# Patient Record
Sex: FEMALE | Race: WHITE | NOT HISPANIC OR LATINO
[De-identification: names, ages, dates, MRNs, and addresses within clinical notes are randomized per-mention and may not be internally consistent; named-entity substitution may affect disease eponyms.]

---

## 2017-01-25 ENCOUNTER — APPOINTMENT (OUTPATIENT)
Dept: UROLOGY | Facility: CLINIC | Age: 67
End: 2017-01-25

## 2017-01-25 VITALS
TEMPERATURE: 98.7 F | SYSTOLIC BLOOD PRESSURE: 169 MMHG | DIASTOLIC BLOOD PRESSURE: 84 MMHG | RESPIRATION RATE: 18 BRPM | HEART RATE: 94 BPM

## 2017-01-25 DIAGNOSIS — Z80.3 FAMILY HISTORY OF MALIGNANT NEOPLASM OF BREAST: ICD-10-CM

## 2017-01-27 LAB
APPEARANCE: CLEAR
BACTERIA UR CULT: NORMAL
BACTERIA: NEGATIVE
BILIRUBIN URINE: NEGATIVE
BLOOD URINE: NEGATIVE
COLOR: YELLOW
GLUCOSE QUALITATIVE U: NORMAL MG/DL
HYALINE CASTS: 0 /LPF
KETONES URINE: NEGATIVE
LEUKOCYTE ESTERASE URINE: NEGATIVE
MICROSCOPIC-UA: NORMAL
NITRITE URINE: NEGATIVE
PH URINE: 6
PROTEIN URINE: NEGATIVE MG/DL
RED BLOOD CELLS URINE: 0 /HPF
SPECIFIC GRAVITY URINE: 1.01
SQUAMOUS EPITHELIAL CELLS: 2 /HPF
UROBILINOGEN URINE: NORMAL MG/DL
WHITE BLOOD CELLS URINE: 1 /HPF

## 2017-04-06 ENCOUNTER — APPOINTMENT (OUTPATIENT)
Dept: ORTHOPEDIC SURGERY | Facility: CLINIC | Age: 67
End: 2017-04-06

## 2017-04-06 VITALS — BODY MASS INDEX: 27.96 KG/M2 | HEIGHT: 61.5 IN | WEIGHT: 150 LBS

## 2017-04-06 VITALS — HEART RATE: 79 BPM | SYSTOLIC BLOOD PRESSURE: 176 MMHG | DIASTOLIC BLOOD PRESSURE: 81 MMHG

## 2017-04-25 ENCOUNTER — LABORATORY RESULT (OUTPATIENT)
Age: 67
End: 2017-04-25

## 2017-05-02 ENCOUNTER — APPOINTMENT (OUTPATIENT)
Dept: ENDOCRINOLOGY | Facility: CLINIC | Age: 67
End: 2017-05-02

## 2017-05-02 VITALS
WEIGHT: 149 LBS | OXYGEN SATURATION: 98 % | SYSTOLIC BLOOD PRESSURE: 102 MMHG | BODY MASS INDEX: 28.5 KG/M2 | HEIGHT: 60.5 IN | HEART RATE: 93 BPM | DIASTOLIC BLOOD PRESSURE: 72 MMHG

## 2018-03-15 ENCOUNTER — APPOINTMENT (OUTPATIENT)
Dept: MAMMOGRAPHY | Facility: IMAGING CENTER | Age: 68
End: 2018-03-15
Payer: MEDICARE

## 2018-03-15 ENCOUNTER — OUTPATIENT (OUTPATIENT)
Dept: OUTPATIENT SERVICES | Facility: HOSPITAL | Age: 68
LOS: 1 days | End: 2018-03-15
Payer: MEDICARE

## 2018-03-15 DIAGNOSIS — Z00.8 ENCOUNTER FOR OTHER GENERAL EXAMINATION: ICD-10-CM

## 2018-03-15 PROCEDURE — 77063 BREAST TOMOSYNTHESIS BI: CPT | Mod: 26

## 2018-03-15 PROCEDURE — 77067 SCR MAMMO BI INCL CAD: CPT

## 2018-03-15 PROCEDURE — 77067 SCR MAMMO BI INCL CAD: CPT | Mod: 26

## 2018-03-15 PROCEDURE — 77063 BREAST TOMOSYNTHESIS BI: CPT

## 2018-05-01 ENCOUNTER — APPOINTMENT (OUTPATIENT)
Dept: ENDOCRINOLOGY | Facility: CLINIC | Age: 68
End: 2018-05-01

## 2018-05-31 ENCOUNTER — RESULT REVIEW (OUTPATIENT)
Age: 68
End: 2018-05-31

## 2019-04-30 ENCOUNTER — APPOINTMENT (OUTPATIENT)
Age: 69
End: 2019-04-30
Payer: MEDICARE

## 2019-04-30 ENCOUNTER — OUTPATIENT (OUTPATIENT)
Dept: OUTPATIENT SERVICES | Facility: HOSPITAL | Age: 69
LOS: 1 days | End: 2019-04-30
Payer: MEDICARE

## 2019-04-30 DIAGNOSIS — Z00.8 ENCOUNTER FOR OTHER GENERAL EXAMINATION: ICD-10-CM

## 2019-04-30 PROCEDURE — 77063 BREAST TOMOSYNTHESIS BI: CPT | Mod: 26

## 2019-04-30 PROCEDURE — 77067 SCR MAMMO BI INCL CAD: CPT

## 2019-04-30 PROCEDURE — 77067 SCR MAMMO BI INCL CAD: CPT | Mod: 26

## 2019-04-30 PROCEDURE — 76641 ULTRASOUND BREAST COMPLETE: CPT | Mod: 26,50

## 2019-04-30 PROCEDURE — 77063 BREAST TOMOSYNTHESIS BI: CPT

## 2019-04-30 PROCEDURE — 76641 ULTRASOUND BREAST COMPLETE: CPT

## 2019-07-03 ENCOUNTER — APPOINTMENT (OUTPATIENT)
Dept: INTERNAL MEDICINE | Facility: CLINIC | Age: 69
End: 2019-07-03
Payer: MEDICARE

## 2019-07-03 PROCEDURE — 36415 COLL VENOUS BLD VENIPUNCTURE: CPT

## 2019-07-05 ENCOUNTER — RX RENEWAL (OUTPATIENT)
Age: 69
End: 2019-07-05

## 2019-07-05 ENCOUNTER — RECORD ABSTRACTING (OUTPATIENT)
Age: 69
End: 2019-07-05

## 2019-07-05 DIAGNOSIS — Z82.49 FAMILY HISTORY OF ISCHEMIC HEART DISEASE AND OTHER DISEASES OF THE CIRCULATORY SYSTEM: ICD-10-CM

## 2019-07-05 DIAGNOSIS — Z92.89 PERSONAL HISTORY OF OTHER MEDICAL TREATMENT: ICD-10-CM

## 2019-07-05 DIAGNOSIS — R73.03 PREDIABETES.: ICD-10-CM

## 2019-07-05 DIAGNOSIS — Z87.39 PERSONAL HISTORY OF OTHER DISEASES OF THE MUSCULOSKELETAL SYSTEM AND CONNECTIVE TISSUE: ICD-10-CM

## 2019-07-08 ENCOUNTER — NON-APPOINTMENT (OUTPATIENT)
Age: 69
End: 2019-07-08

## 2019-07-08 ENCOUNTER — APPOINTMENT (OUTPATIENT)
Dept: INTERNAL MEDICINE | Facility: CLINIC | Age: 69
End: 2019-07-08
Payer: MEDICARE

## 2019-07-08 VITALS — BODY MASS INDEX: 27.48 KG/M2 | HEIGHT: 60 IN | WEIGHT: 140 LBS

## 2019-07-08 VITALS — DIASTOLIC BLOOD PRESSURE: 70 MMHG | SYSTOLIC BLOOD PRESSURE: 110 MMHG

## 2019-07-08 DIAGNOSIS — M75.81 OTHER SHOULDER LESIONS, RIGHT SHOULDER: ICD-10-CM

## 2019-07-08 LAB
ALBUMIN SERPL ELPH-MCNC: 4.3 G/DL
ALP BLD-CCNC: 71 U/L
ALT SERPL-CCNC: 18 U/L
ANION GAP SERPL CALC-SCNC: 11 MMOL/L
AST SERPL-CCNC: 22 U/L
BASOPHILS # BLD AUTO: 0.04 K/UL
BASOPHILS NFR BLD AUTO: 0.6 %
BILIRUB SERPL-MCNC: 0.8 MG/DL
BILIRUB UR QL STRIP: NEGATIVE
BUN SERPL-MCNC: 15 MG/DL
CALCIUM SERPL-MCNC: 9.6 MG/DL
CHLORIDE SERPL-SCNC: 106 MMOL/L
CHOLEST SERPL-MCNC: 220 MG/DL
CHOLEST/HDLC SERPL: 2.4 RATIO
CO2 SERPL-SCNC: 25 MMOL/L
CREAT SERPL-MCNC: 0.88 MG/DL
EOSINOPHIL # BLD AUTO: 0.2 K/UL
EOSINOPHIL NFR BLD AUTO: 2.9 %
GLUCOSE SERPL-MCNC: 91 MG/DL
GLUCOSE UR-MCNC: NEGATIVE
HCG UR QL: 0.2 EU/DL
HCT VFR BLD CALC: 41.5 %
HDLC SERPL-MCNC: 93 MG/DL
HGB BLD-MCNC: 12.9 G/DL
HGB UR QL STRIP.AUTO: NEGATIVE
IMM GRANULOCYTES NFR BLD AUTO: 0.1 %
KETONES UR-MCNC: NEGATIVE
LDLC SERPL CALC-MCNC: 115 MG/DL
LEUKOCYTE ESTERASE UR QL STRIP: NORMAL
LYMPHOCYTES # BLD AUTO: 2.41 K/UL
LYMPHOCYTES NFR BLD AUTO: 35 %
MAN DIFF?: NORMAL
MCHC RBC-ENTMCNC: 29 PG
MCHC RBC-ENTMCNC: 31.1 GM/DL
MCV RBC AUTO: 93.3 FL
MONOCYTES # BLD AUTO: 0.57 K/UL
MONOCYTES NFR BLD AUTO: 8.3 %
NEUTROPHILS # BLD AUTO: 3.65 K/UL
NEUTROPHILS NFR BLD AUTO: 53.1 %
NITRITE UR QL STRIP: NEGATIVE
PH UR STRIP: 5.5
PLATELET # BLD AUTO: 210 K/UL
POTASSIUM SERPL-SCNC: 5.4 MMOL/L
PROT SERPL-MCNC: 6.7 G/DL
PROT UR STRIP-MCNC: NORMAL
RBC # BLD: 4.45 M/UL
RBC # FLD: 13.1 %
SODIUM SERPL-SCNC: 142 MMOL/L
SP GR UR STRIP: 1.03
TRIGL SERPL-MCNC: 61 MG/DL
WBC # FLD AUTO: 6.88 K/UL

## 2019-07-08 PROCEDURE — G0442 ANNUAL ALCOHOL SCREEN 15 MIN: CPT | Mod: 59

## 2019-07-08 PROCEDURE — G0444 DEPRESSION SCREEN ANNUAL: CPT | Mod: 59

## 2019-07-08 PROCEDURE — G0439: CPT

## 2019-07-08 PROCEDURE — 81003 URINALYSIS AUTO W/O SCOPE: CPT | Mod: QW

## 2019-07-08 PROCEDURE — 93000 ELECTROCARDIOGRAM COMPLETE: CPT

## 2019-07-08 PROCEDURE — 93306 TTE W/DOPPLER COMPLETE: CPT

## 2019-07-08 RX ORDER — IBANDRONATE SODIUM 150 MG/1
150 TABLET ORAL
Qty: 3 | Refills: 3 | Status: COMPLETED | COMMUNITY
Start: 2017-05-22 | End: 2019-07-08

## 2019-07-08 RX ORDER — DICLOFENAC SODIUM 1% 10 MG/G
GEL TOPICAL
Refills: 0 | Status: COMPLETED | COMMUNITY
End: 2019-07-08

## 2019-07-08 NOTE — HISTORY OF PRESENT ILLNESS
[FreeTextEntry1] : here for complete exam [de-identified] : exercises regular\par yoga, some weights, some aerobics, no symptoms with exercise\par past thyroid cancer 1997, sees elizabet for this and osteoporosis\par hx aortic insufficiency, father had AVR

## 2019-07-08 NOTE — HEALTH RISK ASSESSMENT
[No] : No [No falls in past year] : Patient reported no falls in the past year [0] : 1) Little interest or pleasure doing things: Not at all (0) [Patient reported mammogram was normal] : Patient reported mammogram was normal [Patient reported colonoscopy was normal] : Patient reported colonoscopy was normal [] : No [[id number]] : 0 [MammogramDate] : 4/2019 [BoneDensityComments] : followed by endo [ColonoscopyDate] : 9/2013

## 2019-07-10 ENCOUNTER — TRANSCRIPTION ENCOUNTER (OUTPATIENT)
Age: 69
End: 2019-07-10

## 2019-10-22 ENCOUNTER — APPOINTMENT (OUTPATIENT)
Dept: ULTRASOUND IMAGING | Facility: IMAGING CENTER | Age: 69
End: 2019-10-22
Payer: MEDICARE

## 2019-10-22 ENCOUNTER — APPOINTMENT (OUTPATIENT)
Dept: RADIOLOGY | Facility: IMAGING CENTER | Age: 69
End: 2019-10-22
Payer: MEDICARE

## 2019-10-22 ENCOUNTER — OUTPATIENT (OUTPATIENT)
Dept: OUTPATIENT SERVICES | Facility: HOSPITAL | Age: 69
LOS: 1 days | End: 2019-10-22
Payer: MEDICARE

## 2019-10-22 DIAGNOSIS — C73 MALIGNANT NEOPLASM OF THYROID GLAND: ICD-10-CM

## 2019-10-22 PROCEDURE — 77080 DXA BONE DENSITY AXIAL: CPT

## 2019-10-22 PROCEDURE — 77080 DXA BONE DENSITY AXIAL: CPT | Mod: 26

## 2019-10-22 PROCEDURE — 76536 US EXAM OF HEAD AND NECK: CPT | Mod: 26

## 2019-10-22 PROCEDURE — 76536 US EXAM OF HEAD AND NECK: CPT

## 2019-11-06 ENCOUNTER — RESULT REVIEW (OUTPATIENT)
Age: 69
End: 2019-11-06

## 2019-11-06 ENCOUNTER — APPOINTMENT (OUTPATIENT)
Dept: ULTRASOUND IMAGING | Facility: IMAGING CENTER | Age: 69
End: 2019-11-06
Payer: MEDICARE

## 2019-11-06 ENCOUNTER — OUTPATIENT (OUTPATIENT)
Dept: OUTPATIENT SERVICES | Facility: HOSPITAL | Age: 69
LOS: 1 days | End: 2019-11-06
Payer: MEDICARE

## 2019-11-06 DIAGNOSIS — C73 MALIGNANT NEOPLASM OF THYROID GLAND: ICD-10-CM

## 2019-11-06 PROCEDURE — 88173 CYTOPATH EVAL FNA REPORT: CPT | Mod: 26

## 2019-11-06 PROCEDURE — 88173 CYTOPATH EVAL FNA REPORT: CPT

## 2019-11-06 PROCEDURE — 10005 FNA BX W/US GDN 1ST LES: CPT

## 2019-11-07 LAB — NON-GYNECOLOGICAL CYTOLOGY STUDY: SIGNIFICANT CHANGE UP

## 2019-11-16 ENCOUNTER — TRANSCRIPTION ENCOUNTER (OUTPATIENT)
Age: 69
End: 2019-11-16

## 2019-11-20 ENCOUNTER — APPOINTMENT (OUTPATIENT)
Dept: INTERNAL MEDICINE | Facility: CLINIC | Age: 69
End: 2019-11-20
Payer: MEDICARE

## 2019-11-20 VITALS
BODY MASS INDEX: 27.88 KG/M2 | DIASTOLIC BLOOD PRESSURE: 87 MMHG | HEART RATE: 87 BPM | WEIGHT: 142 LBS | OXYGEN SATURATION: 99 % | TEMPERATURE: 98.5 F | SYSTOLIC BLOOD PRESSURE: 167 MMHG | HEIGHT: 60 IN

## 2019-11-20 DIAGNOSIS — R05 COUGH: ICD-10-CM

## 2019-11-20 PROCEDURE — 99213 OFFICE O/P EST LOW 20 MIN: CPT

## 2019-11-20 NOTE — ASSESSMENT
[FreeTextEntry1] : viral resp infection possible drip and airway inflammation leading to cough  now somewhat better

## 2019-11-20 NOTE — PHYSICAL EXAM
[No Acute Distress] : no acute distress [Well Nourished] : well nourished [Well Developed] : well developed [Normal Sclera/Conjunctiva] : normal sclera/conjunctiva [Normal Outer Ear/Nose] : the outer ears and nose were normal in appearance [Normal Oropharynx] : the oropharynx was normal [Supple] : supple [No JVD] : no jugular venous distention [No Respiratory Distress] : no respiratory distress  [Clear to Auscultation] : lungs were clear to auscultation bilaterally [No Accessory Muscle Use] : no accessory muscle use [Regular Rhythm] : with a regular rhythm [Normal Rate] : normal rate  [Normal S1, S2] : normal S1 and S2

## 2019-11-20 NOTE — HISTORY OF PRESENT ILLNESS
[FreeTextEntry1] : 2 weeks ago  began with dry cough  no fever  and she is now blowing her nose a lot.  she went to urgent care on 11/16 and had a clear cxr and given an albuterol inhaler and antihistamine  with sudafed she thinks it has helped a little.  cough a little at worse at night  no definte sob or wheezing

## 2019-12-03 ENCOUNTER — APPOINTMENT (OUTPATIENT)
Dept: SURGERY | Facility: CLINIC | Age: 69
End: 2019-12-03
Payer: MEDICARE

## 2019-12-03 VITALS
WEIGHT: 140 LBS | SYSTOLIC BLOOD PRESSURE: 177 MMHG | HEART RATE: 89 BPM | DIASTOLIC BLOOD PRESSURE: 76 MMHG | BODY MASS INDEX: 26.43 KG/M2 | HEIGHT: 61 IN

## 2019-12-03 PROCEDURE — 99204 OFFICE O/P NEW MOD 45 MIN: CPT

## 2019-12-03 PROCEDURE — 31575 DIAGNOSTIC LARYNGOSCOPY: CPT

## 2019-12-06 NOTE — PHYSICAL EXAM
[de-identified] : no palpable thyroid nodules [de-identified] : well healed scar [Nasal Endoscopy Performed] : nasal endoscopy was performed, see procedure section for findings [Laryngoscopy Performed] : laryngoscopy was performed, see procedure section for findings [Midline] : located in midline position [Normal] : orientation to person, place, and time: normal [de-identified] : fiberoptic laryngoscopy shows normal vocal cord mobility bilaterally with no lesions noted

## 2019-12-06 NOTE — CONSULT LETTER
[Dear  ___] : Dear ~KRISTINA, [Consult Letter:] : I had the pleasure of evaluating your patient, [unfilled]. [Please see my note below.] : Please see my note below. [Consult Closing:] : Thank you very much for allowing me to participate in the care of this patient.  If you have any questions, please do not hesitate to contact me. [Sincerely,] : Sincerely, [FreeTextEntry2] : Dr. Darrell Root, Dr. Ab Garner [FreeTextEntry3] : Ernesto Chairez MD, FACS\par System Director, Endocrine Surgery\par [facility]\par  [DrAna Lilia  ___] : Dr. MARCELO

## 2019-12-06 NOTE — REASON FOR VISIT
[Initial Consultation] : an initial consultation for [Spouse] : spouse [FreeTextEntry2] : Thyroid malignant neoplasm

## 2019-12-06 NOTE — ASSESSMENT
[FreeTextEntry1] : lengthy discussion regarding options for management including active surveillance vs scan with RASMUSSEN treatment vs surgery. risks, benefits and alternatives discussed at length.  I have discussed with the patient the anatomy of the area, the pathophysiology of the disease process and the rationale for surgery.  The attendant risks, possible complications and expected postoperative course have been discussed in detail.  I have given the patient the opportunity to ask questions, and all questions have been answered to the patient's satisfaction, and they wish to proceed.  discussed that mass has likely been present since original surgery, although uncertain of any growth or rate of growth.  requested Thyrogen scan to assess if it is iodine avid and will then discuss further the next step.  d/w dr Root who concurs. to call next week for results.

## 2019-12-06 NOTE — HISTORY OF PRESENT ILLNESS
[de-identified] : Pt with history of Total Thyroidectomy and RASMUSSEN for malignancy by dr Munoz in 1997.  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  recent sonogram shows Lymph node right paratracheal region.   denies dysphagia, hoarseness or SOB \par sonogram 7 mm nodule right side of trachea\par FNA positive for malignancy\par normal TFTs

## 2020-01-08 ENCOUNTER — OTHER (OUTPATIENT)
Age: 70
End: 2020-01-08

## 2020-07-01 ENCOUNTER — APPOINTMENT (OUTPATIENT)
Dept: ULTRASOUND IMAGING | Facility: IMAGING CENTER | Age: 70
End: 2020-07-01
Payer: MEDICARE

## 2020-07-01 ENCOUNTER — OUTPATIENT (OUTPATIENT)
Dept: OUTPATIENT SERVICES | Facility: HOSPITAL | Age: 70
LOS: 1 days | End: 2020-07-01
Payer: MEDICARE

## 2020-07-01 DIAGNOSIS — Z00.8 ENCOUNTER FOR OTHER GENERAL EXAMINATION: ICD-10-CM

## 2020-07-01 PROCEDURE — 76536 US EXAM OF HEAD AND NECK: CPT

## 2020-07-01 PROCEDURE — 76536 US EXAM OF HEAD AND NECK: CPT | Mod: 26

## 2020-08-12 ENCOUNTER — OUTPATIENT (OUTPATIENT)
Dept: OUTPATIENT SERVICES | Facility: HOSPITAL | Age: 70
LOS: 1 days | End: 2020-08-12
Payer: MEDICARE

## 2020-08-12 ENCOUNTER — APPOINTMENT (OUTPATIENT)
Dept: ULTRASOUND IMAGING | Facility: IMAGING CENTER | Age: 70
End: 2020-08-12
Payer: MEDICARE

## 2020-08-12 ENCOUNTER — APPOINTMENT (OUTPATIENT)
Dept: MAMMOGRAPHY | Facility: IMAGING CENTER | Age: 70
End: 2020-08-12
Payer: MEDICARE

## 2020-08-12 DIAGNOSIS — Z00.8 ENCOUNTER FOR OTHER GENERAL EXAMINATION: ICD-10-CM

## 2020-08-12 PROCEDURE — 77063 BREAST TOMOSYNTHESIS BI: CPT

## 2020-08-12 PROCEDURE — 77067 SCR MAMMO BI INCL CAD: CPT | Mod: 26

## 2020-08-12 PROCEDURE — 77063 BREAST TOMOSYNTHESIS BI: CPT | Mod: 26

## 2020-08-12 PROCEDURE — 77067 SCR MAMMO BI INCL CAD: CPT

## 2021-01-06 ENCOUNTER — OUTPATIENT (OUTPATIENT)
Dept: OUTPATIENT SERVICES | Facility: HOSPITAL | Age: 71
LOS: 1 days | End: 2021-01-06
Payer: MEDICARE

## 2021-01-06 ENCOUNTER — APPOINTMENT (OUTPATIENT)
Dept: ULTRASOUND IMAGING | Facility: IMAGING CENTER | Age: 71
End: 2021-01-06
Payer: MEDICARE

## 2021-01-06 DIAGNOSIS — C73 MALIGNANT NEOPLASM OF THYROID GLAND: ICD-10-CM

## 2021-01-06 PROCEDURE — 76536 US EXAM OF HEAD AND NECK: CPT

## 2021-01-06 PROCEDURE — 76536 US EXAM OF HEAD AND NECK: CPT | Mod: 26

## 2021-01-15 ENCOUNTER — NON-APPOINTMENT (OUTPATIENT)
Age: 71
End: 2021-01-15

## 2021-01-26 ENCOUNTER — APPOINTMENT (OUTPATIENT)
Dept: SURGERY | Facility: CLINIC | Age: 71
End: 2021-01-26
Payer: MEDICARE

## 2021-01-26 PROCEDURE — 99214 OFFICE O/P EST MOD 30 MIN: CPT

## 2021-01-26 NOTE — HISTORY OF PRESENT ILLNESS
[de-identified] : 25 years s/p total thyroidectomy with postop RASMUSSEN for tall cell variant papillary carcinoma by dr luke.  sonogram shows right thyroid bed nodule..  TG suppressed.  recent sonogram unchanged. bloods stable. no changes medically since last visit. denies dysphagia, hoarseness or new lesions.  I have reviewed all old and new data and available images.

## 2021-01-26 NOTE — ASSESSMENT
[FreeTextEntry1] : d/w dr Root. are in agreement to continue active surveillance. to return earlier if any change. sonogram next visit per dr Root

## 2021-01-26 NOTE — PHYSICAL EXAM
[de-identified] : well healed scar [de-identified] : no palpable thyroid nodules [Laryngoscopy Performed] : laryngoscopy was performed, see procedure section for findings [Midline] : located in midline position [Normal] : orientation to person, place, and time: normal

## 2021-02-16 ENCOUNTER — APPOINTMENT (OUTPATIENT)
Dept: INTERNAL MEDICINE | Facility: CLINIC | Age: 71
End: 2021-02-16

## 2021-02-24 ENCOUNTER — APPOINTMENT (OUTPATIENT)
Dept: INTERNAL MEDICINE | Facility: CLINIC | Age: 71
End: 2021-02-24
Payer: MEDICARE

## 2021-02-24 ENCOUNTER — NON-APPOINTMENT (OUTPATIENT)
Age: 71
End: 2021-02-24

## 2021-02-24 VITALS — SYSTOLIC BLOOD PRESSURE: 130 MMHG | DIASTOLIC BLOOD PRESSURE: 84 MMHG

## 2021-02-24 VITALS — WEIGHT: 146 LBS | BODY MASS INDEX: 28.66 KG/M2 | HEIGHT: 60 IN

## 2021-02-24 VITALS — DIASTOLIC BLOOD PRESSURE: 78 MMHG | SYSTOLIC BLOOD PRESSURE: 130 MMHG

## 2021-02-24 DIAGNOSIS — Z23 ENCOUNTER FOR IMMUNIZATION: ICD-10-CM

## 2021-02-24 PROCEDURE — G0444 DEPRESSION SCREEN ANNUAL: CPT | Mod: 59

## 2021-02-24 PROCEDURE — 93000 ELECTROCARDIOGRAM COMPLETE: CPT | Mod: 59

## 2021-02-24 PROCEDURE — G0439: CPT

## 2021-02-24 NOTE — HISTORY OF PRESENT ILLNESS
[FreeTextEntry1] : here for complete exam [de-identified] : exercises less regularly\par at home still does yoga, some weights, some aerobics, no symptoms with exercise, but not nearly as much as when gyms were open\par past tall cell papillary thyroid cancer 1997, one positive LN, treated with thyroidectomy and RASMUSSEN,sees elizabet Rootfor this and osteoporosis\par hx aortic insufficiency, father had AVR\par some weight gain\par home /80

## 2021-02-24 NOTE — ASSESSMENT
[FreeTextEntry1] : Diastolic blood pressure higher and closed to  what we might consider treatment for given her aortic insufficiency and borderline enlargement of her ascending aorta\par Closely followed for her thyroid and for her osteoporosis by Darrell Root\par On time with all screenings\par \par

## 2021-02-24 NOTE — PLAN
[FreeTextEntry1] : Improve diet and lose some weight\par Recheck blood pressure 3-4 months\par Consider medication if indicated\par Repeat echocardiogram

## 2021-02-24 NOTE — HEALTH RISK ASSESSMENT
[Patient reported PAP Smear was normal] : Patient reported PAP Smear was normal [] : No [[id number]] : 0 [MammogramDate] : 8/2020 [PapSmearDate] : 3/2020 [BoneDensityComments] : followed by endo [ColonoscopyDate] : 9/2013

## 2021-02-25 ENCOUNTER — APPOINTMENT (OUTPATIENT)
Dept: INTERNAL MEDICINE | Facility: CLINIC | Age: 71
End: 2021-02-25
Payer: MEDICARE

## 2021-02-25 ENCOUNTER — RESULT CHARGE (OUTPATIENT)
Age: 71
End: 2021-02-25

## 2021-02-25 DIAGNOSIS — R31.29 OTHER MICROSCOPIC HEMATURIA: ICD-10-CM

## 2021-02-25 PROCEDURE — 81003 URINALYSIS AUTO W/O SCOPE: CPT | Mod: QW

## 2021-02-25 PROCEDURE — 93306 TTE W/DOPPLER COMPLETE: CPT

## 2021-03-02 DIAGNOSIS — N39.0 URINARY TRACT INFECTION, SITE NOT SPECIFIED: ICD-10-CM

## 2021-03-02 DIAGNOSIS — B96.20 URINARY TRACT INFECTION, SITE NOT SPECIFIED: ICD-10-CM

## 2021-03-02 LAB
APPEARANCE: ABNORMAL
APPEARANCE: CLEAR
BACTERIA UR CULT: ABNORMAL
BACTERIA: ABNORMAL
BACTERIA: NEGATIVE
BILIRUB UR QL STRIP: NEGATIVE
BILIRUBIN URINE: NEGATIVE
BILIRUBIN URINE: NEGATIVE
BLOOD URINE: ABNORMAL
BLOOD URINE: NEGATIVE
CLARITY UR: NORMAL
COLLECTION METHOD: NORMAL
COLOR: YELLOW
COLOR: YELLOW
GLUCOSE QUALITATIVE U: NEGATIVE
GLUCOSE QUALITATIVE U: NEGATIVE
GLUCOSE UR-MCNC: NEGATIVE
HCG UR QL: NORMAL EU/DL
HGB UR QL STRIP.AUTO: NEGATIVE
HYALINE CASTS: 0 /LPF
HYALINE CASTS: 2 /LPF
KETONES UR-MCNC: NEGATIVE
KETONES URINE: NEGATIVE
KETONES URINE: NEGATIVE
LEUKOCYTE ESTERASE UR QL STRIP: NORMAL
LEUKOCYTE ESTERASE URINE: ABNORMAL
LEUKOCYTE ESTERASE URINE: NEGATIVE
MICROSCOPIC-UA: NORMAL
MICROSCOPIC-UA: NORMAL
NITRITE UR QL STRIP: NORMAL
NITRITE URINE: NEGATIVE
NITRITE URINE: POSITIVE
PH UR STRIP: 5
PH URINE: 5.5
PH URINE: 6
PROT UR STRIP-MCNC: NEGATIVE
PROTEIN URINE: NEGATIVE
PROTEIN URINE: NORMAL
RED BLOOD CELLS URINE: 39 /HPF
RED BLOOD CELLS URINE: 6 /HPF
SP GR UR STRIP: 1.02
SPECIFIC GRAVITY URINE: 1.02
SPECIFIC GRAVITY URINE: 1.02
SQUAMOUS EPITHELIAL CELLS: 16 /HPF
SQUAMOUS EPITHELIAL CELLS: 2 /HPF
UROBILINOGEN URINE: NORMAL
UROBILINOGEN URINE: NORMAL
WHITE BLOOD CELLS URINE: 1 /HPF
WHITE BLOOD CELLS URINE: 9 /HPF

## 2021-03-05 PROBLEM — R31.29 MICROSCOPIC HEMATURIA: Status: ACTIVE | Noted: 2017-01-25

## 2021-04-29 ENCOUNTER — APPOINTMENT (OUTPATIENT)
Dept: OBGYN | Facility: CLINIC | Age: 71
End: 2021-04-29
Payer: MEDICARE

## 2021-04-29 VITALS
BODY MASS INDEX: 28.71 KG/M2 | DIASTOLIC BLOOD PRESSURE: 76 MMHG | SYSTOLIC BLOOD PRESSURE: 124 MMHG | WEIGHT: 156 LBS | HEIGHT: 62 IN

## 2021-04-29 DIAGNOSIS — M81.0 AGE-RELATED OSTEOPOROSIS W/OUT CURRENT PATHOLOGICAL FRACTURE: ICD-10-CM

## 2021-04-29 PROCEDURE — G0101: CPT | Mod: GA

## 2021-04-29 PROCEDURE — 82274 ASSAY TEST FOR BLOOD FECAL: CPT | Mod: QW

## 2021-06-02 ENCOUNTER — APPOINTMENT (OUTPATIENT)
Dept: INTERNAL MEDICINE | Facility: CLINIC | Age: 71
End: 2021-06-02
Payer: MEDICARE

## 2021-06-02 VITALS — DIASTOLIC BLOOD PRESSURE: 78 MMHG | SYSTOLIC BLOOD PRESSURE: 118 MMHG

## 2021-06-02 VITALS — SYSTOLIC BLOOD PRESSURE: 128 MMHG | DIASTOLIC BLOOD PRESSURE: 80 MMHG

## 2021-06-02 VITALS — BODY MASS INDEX: 26.7 KG/M2 | WEIGHT: 146 LBS

## 2021-06-02 PROCEDURE — 99214 OFFICE O/P EST MOD 30 MIN: CPT

## 2021-06-02 RX ORDER — SULFAMETHOXAZOLE AND TRIMETHOPRIM 800; 160 MG/1; MG/1
800-160 TABLET ORAL TWICE DAILY
Qty: 14 | Refills: 0 | Status: COMPLETED | COMMUNITY
Start: 2021-03-02 | End: 2021-06-02

## 2021-06-02 NOTE — ASSESSMENT
[FreeTextEntry1] : Diastolic blood pressure better\par systolic pressure great\par some intentional weight loss\par Closely followed for her thyroid and for her osteoporosis by Darrell Root\par On time with all screenings\par \par

## 2021-06-02 NOTE — PLAN
[FreeTextEntry1] : continue lifestyle and exercise changes\par no anti-HTN meds indicated at this time

## 2021-06-02 NOTE — HISTORY OF PRESENT ILLNESS
[FreeTextEntry1] : here to follow elevated DBP [de-identified] : exercises less regularly, still not ready to return to gym\par at home still does yoga, some weights, some aerobics\par past tall cell papillary thyroid cancer 1997, one positive LN, treated with thyroidectomy and RASMUSSEN,sees elizabet Root for this and osteoporosis\par hx aortic insufficiency, father had AVR, moderate A! echo 2/2021\par some weight loss since last visit\par home /80\par BP ok at gyn

## 2021-09-02 ENCOUNTER — RESULT REVIEW (OUTPATIENT)
Age: 71
End: 2021-09-02

## 2021-09-02 ENCOUNTER — APPOINTMENT (OUTPATIENT)
Dept: MAMMOGRAPHY | Facility: IMAGING CENTER | Age: 71
End: 2021-09-02
Payer: MEDICARE

## 2021-09-02 ENCOUNTER — NON-APPOINTMENT (OUTPATIENT)
Age: 71
End: 2021-09-02

## 2021-09-02 ENCOUNTER — OUTPATIENT (OUTPATIENT)
Dept: OUTPATIENT SERVICES | Facility: HOSPITAL | Age: 71
LOS: 1 days | End: 2021-09-02
Payer: MEDICARE

## 2021-09-02 ENCOUNTER — APPOINTMENT (OUTPATIENT)
Dept: ULTRASOUND IMAGING | Facility: IMAGING CENTER | Age: 71
End: 2021-09-02
Payer: MEDICARE

## 2021-09-02 DIAGNOSIS — Z00.00 ENCOUNTER FOR GENERAL ADULT MEDICAL EXAMINATION WITHOUT ABNORMAL FINDINGS: ICD-10-CM

## 2021-09-02 DIAGNOSIS — M81.0 AGE-RELATED OSTEOPOROSIS WITHOUT CURRENT PATHOLOGICAL FRACTURE: ICD-10-CM

## 2021-09-02 PROCEDURE — 76641 ULTRASOUND BREAST COMPLETE: CPT | Mod: 26,50

## 2021-09-02 PROCEDURE — 76641 ULTRASOUND BREAST COMPLETE: CPT

## 2021-09-02 PROCEDURE — 77067 SCR MAMMO BI INCL CAD: CPT

## 2021-09-02 PROCEDURE — 77067 SCR MAMMO BI INCL CAD: CPT | Mod: 26

## 2021-09-02 PROCEDURE — 77063 BREAST TOMOSYNTHESIS BI: CPT | Mod: 26

## 2021-09-02 PROCEDURE — 77063 BREAST TOMOSYNTHESIS BI: CPT

## 2021-09-03 ENCOUNTER — NON-APPOINTMENT (OUTPATIENT)
Age: 71
End: 2021-09-03

## 2021-09-10 ENCOUNTER — RESULT REVIEW (OUTPATIENT)
Age: 71
End: 2021-09-10

## 2021-09-10 ENCOUNTER — APPOINTMENT (OUTPATIENT)
Dept: MAMMOGRAPHY | Facility: IMAGING CENTER | Age: 71
End: 2021-09-10
Payer: MEDICARE

## 2021-09-10 ENCOUNTER — OUTPATIENT (OUTPATIENT)
Dept: OUTPATIENT SERVICES | Facility: HOSPITAL | Age: 71
LOS: 1 days | End: 2021-09-10
Payer: MEDICARE

## 2021-09-10 ENCOUNTER — APPOINTMENT (OUTPATIENT)
Dept: ULTRASOUND IMAGING | Facility: IMAGING CENTER | Age: 71
End: 2021-09-10
Payer: MEDICARE

## 2021-09-10 DIAGNOSIS — Z00.8 ENCOUNTER FOR OTHER GENERAL EXAMINATION: ICD-10-CM

## 2021-09-10 PROCEDURE — G0279: CPT | Mod: 26

## 2021-09-10 PROCEDURE — 77065 DX MAMMO INCL CAD UNI: CPT

## 2021-09-10 PROCEDURE — 77065 DX MAMMO INCL CAD UNI: CPT | Mod: 26,RT

## 2021-09-10 PROCEDURE — 76642 ULTRASOUND BREAST LIMITED: CPT | Mod: 26,RT

## 2021-09-10 PROCEDURE — 76642 ULTRASOUND BREAST LIMITED: CPT

## 2021-09-10 PROCEDURE — G0279: CPT

## 2021-09-13 ENCOUNTER — NON-APPOINTMENT (OUTPATIENT)
Age: 71
End: 2021-09-13

## 2021-09-16 DIAGNOSIS — N63.0 UNSPECIFIED LUMP IN UNSPECIFIED BREAST: ICD-10-CM

## 2021-09-17 ENCOUNTER — RESULT REVIEW (OUTPATIENT)
Age: 71
End: 2021-09-17

## 2021-09-17 ENCOUNTER — APPOINTMENT (OUTPATIENT)
Dept: ULTRASOUND IMAGING | Facility: IMAGING CENTER | Age: 71
End: 2021-09-17
Payer: MEDICARE

## 2021-09-17 ENCOUNTER — OUTPATIENT (OUTPATIENT)
Dept: OUTPATIENT SERVICES | Facility: HOSPITAL | Age: 71
LOS: 1 days | End: 2021-09-17
Payer: MEDICARE

## 2021-09-17 DIAGNOSIS — Z00.8 ENCOUNTER FOR OTHER GENERAL EXAMINATION: ICD-10-CM

## 2021-09-17 PROCEDURE — 77065 DX MAMMO INCL CAD UNI: CPT

## 2021-09-17 PROCEDURE — 19083 BX BREAST 1ST LESION US IMAG: CPT

## 2021-09-17 PROCEDURE — 77065 DX MAMMO INCL CAD UNI: CPT | Mod: 26,RT

## 2021-09-17 PROCEDURE — A4648: CPT

## 2021-09-17 PROCEDURE — 19083 BX BREAST 1ST LESION US IMAG: CPT | Mod: RT

## 2021-09-21 LAB — SURGICAL PATHOLOGY STUDY: SIGNIFICANT CHANGE UP

## 2021-12-03 ENCOUNTER — NON-APPOINTMENT (OUTPATIENT)
Age: 71
End: 2021-12-03

## 2022-01-06 ENCOUNTER — APPOINTMENT (OUTPATIENT)
Dept: RADIOLOGY | Facility: IMAGING CENTER | Age: 72
End: 2022-01-06
Payer: MEDICARE

## 2022-01-06 ENCOUNTER — OUTPATIENT (OUTPATIENT)
Dept: OUTPATIENT SERVICES | Facility: HOSPITAL | Age: 72
LOS: 1 days | End: 2022-01-06
Payer: MEDICARE

## 2022-01-06 ENCOUNTER — APPOINTMENT (OUTPATIENT)
Dept: ULTRASOUND IMAGING | Facility: IMAGING CENTER | Age: 72
End: 2022-01-06
Payer: MEDICARE

## 2022-01-06 DIAGNOSIS — Z00.8 ENCOUNTER FOR OTHER GENERAL EXAMINATION: ICD-10-CM

## 2022-01-06 PROCEDURE — 76536 US EXAM OF HEAD AND NECK: CPT

## 2022-01-06 PROCEDURE — 76536 US EXAM OF HEAD AND NECK: CPT | Mod: 26

## 2022-01-06 PROCEDURE — 77080 DXA BONE DENSITY AXIAL: CPT | Mod: 26

## 2022-01-06 PROCEDURE — 77080 DXA BONE DENSITY AXIAL: CPT

## 2022-01-27 ENCOUNTER — APPOINTMENT (OUTPATIENT)
Dept: SURGERY | Facility: CLINIC | Age: 72
End: 2022-01-27
Payer: MEDICARE

## 2022-01-27 PROCEDURE — 99214 OFFICE O/P EST MOD 30 MIN: CPT

## 2022-01-27 NOTE — ASSESSMENT
[FreeTextEntry1] : will observe.  to return earlier if any change. sonogram and bloods per dr Root.  patient has been given the opportunity to ask questions, and all of the patient's questions have been answered to their satisfaction\par

## 2022-01-27 NOTE — HISTORY OF PRESENT ILLNESS
[de-identified] : 26 years s/p total thyroidectomy with postop RASMUSSEN for tall cell variant papillary carcinoma by dr Munoz.  sonogram shows right thyroid bed nodule..  TG suppressed.  recent sonogram unchanged. Synthroid recently decreased to 88 mcg 5 days per week and 75 mcg 2 days per week. . no changes medically since last visit. denies dysphagia, hoarseness or new lesions.  I have reviewed all old and new data and available images.

## 2022-01-27 NOTE — PHYSICAL EXAM
[de-identified] : well healed scar [de-identified] : no palpable thyroid nodules [Laryngoscopy Performed] : laryngoscopy was performed, see procedure section for findings [Midline] : located in midline position [Normal] : orientation to person, place, and time: normal

## 2022-05-03 NOTE — PHYSICAL EXAM
[No Acute Distress] : no acute distress [Well Nourished] : well nourished [Well-Appearing] : well-appearing [Well Developed] : well developed [PERRL] : pupils equal round and reactive to light [Normal Sclera/Conjunctiva] : normal sclera/conjunctiva [EOMI] : extraocular movements intact [No JVD] : no jugular venous distention [Normal Oropharynx] : the oropharynx was normal [Normal Outer Ear/Nose] : the outer ears and nose were normal in appearance [No Lymphadenopathy] : no lymphadenopathy [Supple] : supple [Thyroid Normal, No Nodules] : the thyroid was normal and there were no nodules present [No Respiratory Distress] : no respiratory distress  [No Accessory Muscle Use] : no accessory muscle use [Clear to Auscultation] : lungs were clear to auscultation bilaterally [Normal Rate] : normal rate  [Regular Rhythm] : with a regular rhythm [Normal S1, S2] : normal S1 and S2 No [No Carotid Bruits] : no carotid bruits [I] : a grade 1 [Pedal Pulses Present] : the pedal pulses are present [No Varicosities] : no varicosities [No Abdominal Bruit] : a ~M bruit was not heard ~T in the abdomen [No Extremity Clubbing/Cyanosis] : no extremity clubbing/cyanosis [No Edema] : there was no peripheral edema [No Palpable Aorta] : no palpable aorta [Declined Breast Exam] : declined breast exam  [Soft] : abdomen soft [Non-distended] : non-distended [No Masses] : no abdominal mass palpated [Non Tender] : non-tender [No HSM] : no HSM [Normal Bowel Sounds] : normal bowel sounds [No Mass] : no mass [Normal Sphincter Tone] : normal sphincter tone [Normal Anterior Cervical Nodes] : no anterior cervical lymphadenopathy [No CVA Tenderness] : no CVA  tenderness [Normal Posterior Cervical Nodes] : no posterior cervical lymphadenopathy [No Spinal Tenderness] : no spinal tenderness [No Joint Swelling] : no joint swelling [Grossly Normal Strength/Tone] : grossly normal strength/tone [No Rash] : no rash [Coordination Grossly Intact] : coordination grossly intact [No Focal Deficits] : no focal deficits [Normal Gait] : normal gait [Normal Affect] : the affect was normal [Deep Tendon Reflexes (DTR)] : deep tendon reflexes were 2+ and symmetric [Normal Insight/Judgement] : insight and judgment were intact [Stool Occult Blood] : stool negative for occult blood

## 2022-06-29 ENCOUNTER — NON-APPOINTMENT (OUTPATIENT)
Age: 72
End: 2022-06-29

## 2022-06-29 ENCOUNTER — APPOINTMENT (OUTPATIENT)
Dept: INTERNAL MEDICINE | Facility: CLINIC | Age: 72
End: 2022-06-29

## 2022-06-29 VITALS
TEMPERATURE: 97.6 F | BODY MASS INDEX: 26.87 KG/M2 | HEIGHT: 62 IN | OXYGEN SATURATION: 98 % | HEART RATE: 86 BPM | WEIGHT: 146 LBS | SYSTOLIC BLOOD PRESSURE: 200 MMHG | DIASTOLIC BLOOD PRESSURE: 80 MMHG

## 2022-06-29 VITALS — DIASTOLIC BLOOD PRESSURE: 78 MMHG | SYSTOLIC BLOOD PRESSURE: 128 MMHG

## 2022-06-29 VITALS — SYSTOLIC BLOOD PRESSURE: 134 MMHG | DIASTOLIC BLOOD PRESSURE: 80 MMHG

## 2022-06-29 DIAGNOSIS — I42.3: ICD-10-CM

## 2022-06-29 LAB
BILIRUB UR QL STRIP: NEGATIVE
CLARITY UR: CLEAR
COLLECTION METHOD: NORMAL
GLUCOSE UR-MCNC: NEGATIVE
HCG UR QL: 0.2 EU/DL
HGB UR QL STRIP.AUTO: NORMAL
KETONES UR-MCNC: NEGATIVE
LEUKOCYTE ESTERASE UR QL STRIP: NORMAL
NITRITE UR QL STRIP: NEGATIVE
PH UR STRIP: 6
PROT UR STRIP-MCNC: NEGATIVE
SP GR UR STRIP: 1.01

## 2022-06-29 PROCEDURE — G0439: CPT

## 2022-06-29 PROCEDURE — 81003 URINALYSIS AUTO W/O SCOPE: CPT | Mod: QW

## 2022-06-29 PROCEDURE — 93000 ELECTROCARDIOGRAM COMPLETE: CPT | Mod: 59

## 2022-06-29 PROCEDURE — G0444 DEPRESSION SCREEN ANNUAL: CPT

## 2022-06-29 RX ORDER — AMOXICILLIN 500 MG/1
500 TABLET, FILM COATED ORAL
Qty: 21 | Refills: 0 | Status: COMPLETED | COMMUNITY
Start: 2022-05-26

## 2022-06-29 RX ORDER — LEVOTHYROXINE SODIUM 0.05 MG/1
50 TABLET ORAL
Qty: 90 | Refills: 0 | Status: ACTIVE | COMMUNITY
Start: 2022-06-16

## 2022-06-29 RX ORDER — AMOXICILLIN 500 MG/1
500 CAPSULE ORAL
Qty: 22 | Refills: 0 | Status: COMPLETED | COMMUNITY
Start: 2022-05-19

## 2022-06-29 NOTE — HISTORY OF PRESENT ILLNESS
[FreeTextEntry1] : here for complete exam [de-identified] : exercises less regularly\par at home still does yoga, some weights, some aerobics, no symptoms with exercise, but not nearly as much as when gyms were open\par past tall cell papillary thyroid cancer 1997, one positive LN, treated with thyroidectomy and RASMUSSEN,sees elizabet Root full recent bloods reviewed\par also sees Dr Chairez  stable right thyroid bed nodule\par osteoporosis on alendronate\par hx aortic insufficiency, father had AVR\par

## 2022-06-29 NOTE — PHYSICAL EXAM
[Well Nourished] : well nourished [Well Developed] : well developed [Well-Appearing] : well-appearing [Normal Sclera/Conjunctiva] : normal sclera/conjunctiva [PERRL] : pupils equal round and reactive to light [EOMI] : extraocular movements intact [Normal Outer Ear/Nose] : the outer ears and nose were normal in appearance [Normal Oropharynx] : the oropharynx was normal [No JVD] : no jugular venous distention [No Lymphadenopathy] : no lymphadenopathy [Supple] : supple [Thyroid Normal, No Nodules] : the thyroid was normal and there were no nodules present [No Respiratory Distress] : no respiratory distress  [No Accessory Muscle Use] : no accessory muscle use [Clear to Auscultation] : lungs were clear to auscultation bilaterally [Normal Rate] : normal rate  [Regular Rhythm] : with a regular rhythm [Normal S1, S2] : normal S1 and S2 [I] : a grade 1 [No Carotid Bruits] : no carotid bruits [No Abdominal Bruit] : a ~M bruit was not heard ~T in the abdomen [No Varicosities] : no varicosities [Pedal Pulses Present] : the pedal pulses are present [No Edema] : there was no peripheral edema [No Palpable Aorta] : no palpable aorta [No Extremity Clubbing/Cyanosis] : no extremity clubbing/cyanosis [Soft] : abdomen soft [Non Tender] : non-tender [Non-distended] : non-distended [No Masses] : no abdominal mass palpated [No HSM] : no HSM [Normal Bowel Sounds] : normal bowel sounds [Normal Posterior Cervical Nodes] : no posterior cervical lymphadenopathy [Normal Anterior Cervical Nodes] : no anterior cervical lymphadenopathy [No CVA Tenderness] : no CVA  tenderness [No Spinal Tenderness] : no spinal tenderness [No Joint Swelling] : no joint swelling [Grossly Normal Strength/Tone] : grossly normal strength/tone [No Rash] : no rash [Coordination Grossly Intact] : coordination grossly intact [No Focal Deficits] : no focal deficits [Normal Gait] : normal gait [Deep Tendon Reflexes (DTR)] : deep tendon reflexes were 2+ and symmetric [Normal Affect] : the affect was normal [Normal Insight/Judgement] : insight and judgment were intact

## 2022-06-29 NOTE — HEALTH RISK ASSESSMENT
[No] : No [No falls in past year] : Patient reported no falls in the past year [0] : 2) Feeling down, depressed, or hopeless: Not at all (0) [Patient reported mammogram was normal] : Patient reported mammogram was normal [Patient reported PAP Smear was normal] : Patient reported PAP Smear was normal [Patient reported colonoscopy was normal] : Patient reported colonoscopy was normal [Never] : Never [PHQ-2 Negative - No further assessment needed] : PHQ-2 Negative - No further assessment needed [[id number]] : 0 [MammogramDate] : 9/2021 [PapSmearDate] : 4/2021 [BoneDensityComments] : followed by endo [ColonoscopyDate] : 9/2013

## 2022-06-29 NOTE — ASSESSMENT
[FreeTextEntry1] : Closely followed for her thyroid and for her osteoporosis by Darrell Root\par On time with all screenings\par louder diastolic murmur on exam\par moderate AI last echo 2/2021\par recent bloods ok\par

## 2022-06-30 ENCOUNTER — APPOINTMENT (OUTPATIENT)
Dept: INTERNAL MEDICINE | Facility: CLINIC | Age: 72
End: 2022-06-30

## 2022-06-30 LAB
APPEARANCE: ABNORMAL
BACTERIA: NEGATIVE
BILIRUBIN URINE: NEGATIVE
BLOOD URINE: NEGATIVE
CALCIUM OXALATE CRYSTALS: ABNORMAL
COLOR: YELLOW
GLUCOSE QUALITATIVE U: NEGATIVE
HYALINE CASTS: 3 /LPF
KETONES URINE: NEGATIVE
LEUKOCYTE ESTERASE URINE: ABNORMAL
MICROSCOPIC-UA: NORMAL
NITRITE URINE: NEGATIVE
PH URINE: 6
PROTEIN URINE: NORMAL
RED BLOOD CELLS URINE: 2 /HPF
SPECIFIC GRAVITY URINE: 1.02
SQUAMOUS EPITHELIAL CELLS: 17 /HPF
URINE COMMENTS: NORMAL
UROBILINOGEN URINE: NORMAL
WHITE BLOOD CELLS URINE: 4 /HPF

## 2022-06-30 PROCEDURE — 93306 TTE W/DOPPLER COMPLETE: CPT

## 2022-09-08 ENCOUNTER — NON-APPOINTMENT (OUTPATIENT)
Age: 72
End: 2022-09-08

## 2022-09-12 ENCOUNTER — APPOINTMENT (OUTPATIENT)
Dept: OBGYN | Facility: CLINIC | Age: 72
End: 2022-09-12
Payer: MEDICARE

## 2022-09-12 VITALS
DIASTOLIC BLOOD PRESSURE: 84 MMHG | SYSTOLIC BLOOD PRESSURE: 134 MMHG | HEIGHT: 62 IN | BODY MASS INDEX: 26.68 KG/M2 | WEIGHT: 145 LBS

## 2022-09-12 DIAGNOSIS — N90.5 ATROPHY OF VULVA: ICD-10-CM

## 2022-09-12 PROCEDURE — G0101: CPT

## 2022-09-12 PROCEDURE — G0328 FECAL BLOOD SCRN IMMUNOASSAY: CPT | Mod: QW

## 2022-09-12 NOTE — PLAN
[FreeTextEntry1] : 2022. FLORINA MALCOLM 72 year old female  LMP , atrophy, LAVH\par BSE taught\par  mammogram and breast sonogram. Rx given, due now  \par colonoscopy, due this year(had it 9 years ago) \par DEXA bone density. due in  (Q 2 years) \par otc lubricants\par RTO in1 year\par

## 2022-09-12 NOTE — PHYSICAL EXAM
[Chaperone Present] : A chaperone was present in the examining room during all aspects of the physical examination [Appropriately responsive] : appropriately responsive [Alert] : alert [No Acute Distress] : no acute distress [No Lymphadenopathy] : no lymphadenopathy [No Murmurs] : no murmurs [Clear to Auscultation B/L] : clear to auscultation bilaterally [Soft] : soft [Non-tender] : non-tender [Non-distended] : non-distended [No HSM] : No HSM [No Lesions] : no lesions [No Mass] : no mass [Oriented x3] : oriented x3 [Examination Of The Breasts] : a normal appearance [No Masses] : no breast masses were palpable [Labia Majora] : normal [Labia Minora] : normal [Normal] : normal [Uterine Adnexae] : normal [Vulvar Atrophy] : vulvar atrophy [Absent] : absent [FreeTextEntry9] : no masses, guaiac negative

## 2022-09-12 NOTE — HISTORY OF PRESENT ILLNESS
[FreeTextEntry1] : 2022. FLORINA MALCOLM 72 year old female  LMP . She presents for an offers no complaints She denies abnormal vaginal discharge or vaginitis sxs. No urinary complaints. BM is normal per patient, no bloody stool. She denies abdominal and pelvic pain. No new medical conditions, medications or surgeries.\par \par \par PMHx atrophy LAVH in  for fibroids, hypothyroid, and thyroid c/a \par Meds Levothyroxvine, Estrace cream 2x/week and  Alendronate, Mirtazapine and vitamin\par SurgHx breast biopsy,  thyroidectomy\par FHx breast c/a in Mother denies ovarian, uterine or colon cancer\par Allergies no known allergies [TextBox_4] : 09/21 breast biopsy [Mammogramdate] : 09/19/21 [BreastSonogramDate] : 09/19/21 [BoneDensityDate] : 2021 [ColonoscopyDate] : 2013

## 2022-11-21 ENCOUNTER — APPOINTMENT (OUTPATIENT)
Dept: MAMMOGRAPHY | Facility: IMAGING CENTER | Age: 72
End: 2022-11-21

## 2022-11-21 ENCOUNTER — RESULT REVIEW (OUTPATIENT)
Age: 72
End: 2022-11-21

## 2022-11-21 ENCOUNTER — OUTPATIENT (OUTPATIENT)
Dept: OUTPATIENT SERVICES | Facility: HOSPITAL | Age: 72
LOS: 1 days | End: 2022-11-21
Payer: MEDICARE

## 2022-11-21 ENCOUNTER — APPOINTMENT (OUTPATIENT)
Dept: ULTRASOUND IMAGING | Facility: IMAGING CENTER | Age: 72
End: 2022-11-21

## 2022-11-21 DIAGNOSIS — Z00.00 ENCOUNTER FOR GENERAL ADULT MEDICAL EXAMINATION WITHOUT ABNORMAL FINDINGS: ICD-10-CM

## 2022-11-21 PROCEDURE — 77067 SCR MAMMO BI INCL CAD: CPT | Mod: 26

## 2022-11-21 PROCEDURE — 76641 ULTRASOUND BREAST COMPLETE: CPT | Mod: 26,50

## 2022-11-21 PROCEDURE — 77067 SCR MAMMO BI INCL CAD: CPT

## 2022-11-21 PROCEDURE — 76641 ULTRASOUND BREAST COMPLETE: CPT

## 2022-11-21 PROCEDURE — 77063 BREAST TOMOSYNTHESIS BI: CPT | Mod: 26

## 2022-11-21 PROCEDURE — 77063 BREAST TOMOSYNTHESIS BI: CPT

## 2022-12-22 ENCOUNTER — APPOINTMENT (OUTPATIENT)
Dept: INTERNAL MEDICINE | Facility: CLINIC | Age: 72
End: 2022-12-22

## 2022-12-22 VITALS
HEIGHT: 62 IN | OXYGEN SATURATION: 95 % | SYSTOLIC BLOOD PRESSURE: 152 MMHG | WEIGHT: 145 LBS | HEART RATE: 79 BPM | DIASTOLIC BLOOD PRESSURE: 91 MMHG | BODY MASS INDEX: 26.68 KG/M2

## 2022-12-22 VITALS — SYSTOLIC BLOOD PRESSURE: 125 MMHG | WEIGHT: 149 LBS | BODY MASS INDEX: 27.25 KG/M2 | DIASTOLIC BLOOD PRESSURE: 85 MMHG

## 2022-12-22 DIAGNOSIS — N64.89 OTHER SPECIFIED DISORDERS OF BREAST: ICD-10-CM

## 2022-12-22 DIAGNOSIS — E89.0 POSTPROCEDURAL HYPOTHYROIDISM: ICD-10-CM

## 2022-12-22 PROCEDURE — 99213 OFFICE O/P EST LOW 20 MIN: CPT

## 2022-12-22 NOTE — ASSESSMENT
[FreeTextEntry1] : followed for her thyroid and for her osteoporosis by Darrell Root\par prehypertension\par exercising\par weight gain

## 2022-12-22 NOTE — HISTORY OF PRESENT ILLNESS
[FreeTextEntry1] : here to follow ongoing conditions [de-identified] : prehypertension\par exercises\par still does yoga, some weights, some aerobics, senior fitness class, no symptoms with exercise, but not nearly as much as when gyms were open\par past tall cell papillary thyroid cancer 1997, one positive LN, treated with thyroidectomy and RASMUSSEN,sees elizabet Root full recent bloods reviewed\par also sees Dr Chairez  stable right thyroid bed nodule\par osteoporosis on alendronate\par hx aortic insufficiency, father had AVR\par

## 2022-12-23 ENCOUNTER — APPOINTMENT (OUTPATIENT)
Dept: INTERNAL MEDICINE | Facility: CLINIC | Age: 72
End: 2022-12-23

## 2023-01-09 ENCOUNTER — APPOINTMENT (OUTPATIENT)
Dept: ULTRASOUND IMAGING | Facility: IMAGING CENTER | Age: 73
End: 2023-01-09
Payer: MEDICARE

## 2023-01-09 ENCOUNTER — OUTPATIENT (OUTPATIENT)
Dept: OUTPATIENT SERVICES | Facility: HOSPITAL | Age: 73
LOS: 1 days | End: 2023-01-09
Payer: MEDICARE

## 2023-01-09 DIAGNOSIS — Z00.8 ENCOUNTER FOR OTHER GENERAL EXAMINATION: ICD-10-CM

## 2023-01-09 PROCEDURE — 76536 US EXAM OF HEAD AND NECK: CPT

## 2023-01-09 PROCEDURE — 76536 US EXAM OF HEAD AND NECK: CPT | Mod: 26

## 2023-01-26 ENCOUNTER — APPOINTMENT (OUTPATIENT)
Dept: SURGERY | Facility: CLINIC | Age: 73
End: 2023-01-26
Payer: MEDICARE

## 2023-01-26 PROCEDURE — 99214 OFFICE O/P EST MOD 30 MIN: CPT

## 2023-01-26 NOTE — PHYSICAL EXAM
[de-identified] : well healed scar [de-identified] : no palpable thyroid nodules [Laryngoscopy Performed] : laryngoscopy was performed, see procedure section for findings [Midline] : located in midline position [Normal] : orientation to person, place, and time: normal

## 2023-01-26 NOTE — HISTORY OF PRESENT ILLNESS
[de-identified] : 27  years s/p total thyroidectomy with postop RASMUSSEN for tall cell variant papillary carcinoma by dr Munoz.  sonogram shows right thyroid bed nodule..  TG suppressed.  recent sonogram unchanged.   continues Synthroid  88 mcg 5 days per week and 50  mcg 2 days per week. . no changes medically since last visit. denies dysphagia, hoarseness or new lesions.  I have reviewed all old and new data and available images.

## 2023-07-14 ENCOUNTER — NON-APPOINTMENT (OUTPATIENT)
Age: 73
End: 2023-07-14

## 2023-08-31 DIAGNOSIS — E78.9 DISORDER OF LIPOPROTEIN METABOLISM, UNSPECIFIED: ICD-10-CM

## 2023-08-31 DIAGNOSIS — R73.03 PREDIABETES.: ICD-10-CM

## 2023-09-30 ENCOUNTER — LABORATORY RESULT (OUTPATIENT)
Age: 73
End: 2023-09-30

## 2023-10-05 ENCOUNTER — NON-APPOINTMENT (OUTPATIENT)
Age: 73
End: 2023-10-05

## 2023-10-05 ENCOUNTER — LABORATORY RESULT (OUTPATIENT)
Age: 73
End: 2023-10-05

## 2023-10-05 ENCOUNTER — APPOINTMENT (OUTPATIENT)
Dept: INTERNAL MEDICINE | Facility: CLINIC | Age: 73
End: 2023-10-05
Payer: MEDICARE

## 2023-10-05 VITALS — HEIGHT: 62 IN | WEIGHT: 147 LBS | BODY MASS INDEX: 27.05 KG/M2

## 2023-10-05 VITALS — SYSTOLIC BLOOD PRESSURE: 125 MMHG | DIASTOLIC BLOOD PRESSURE: 80 MMHG

## 2023-10-05 VITALS — DIASTOLIC BLOOD PRESSURE: 78 MMHG | SYSTOLIC BLOOD PRESSURE: 125 MMHG

## 2023-10-05 DIAGNOSIS — C73 MALIGNANT NEOPLASM OF THYROID GLAND: ICD-10-CM

## 2023-10-05 DIAGNOSIS — I10 ESSENTIAL (PRIMARY) HYPERTENSION: ICD-10-CM

## 2023-10-05 DIAGNOSIS — I35.1 NONRHEUMATIC AORTIC (VALVE) INSUFFICIENCY: ICD-10-CM

## 2023-10-05 DIAGNOSIS — Z00.00 ENCOUNTER FOR GENERAL ADULT MEDICAL EXAMINATION W/OUT ABNORMAL FINDINGS: ICD-10-CM

## 2023-10-05 DIAGNOSIS — G47.00 INSOMNIA, UNSPECIFIED: ICD-10-CM

## 2023-10-05 DIAGNOSIS — R82.90 UNSPECIFIED ABNORMAL FINDINGS IN URINE: ICD-10-CM

## 2023-10-05 LAB
25(OH)D3 SERPL-MCNC: 40.8 NG/ML
ALBUMIN SERPL ELPH-MCNC: 4.4 G/DL
ALP BLD-CCNC: 97 U/L
ALT SERPL-CCNC: 15 U/L
ANION GAP SERPL CALC-SCNC: 12 MMOL/L
APPEARANCE: ABNORMAL
AST SERPL-CCNC: 24 U/L
BACTERIA: ABNORMAL /HPF
BILIRUB SERPL-MCNC: 1 MG/DL
BILIRUBIN URINE: NEGATIVE
BLOOD URINE: NEGATIVE
BUN SERPL-MCNC: 17 MG/DL
CALCIUM SERPL-MCNC: 9.8 MG/DL
CAST: 2 /LPF
CHLORIDE SERPL-SCNC: 101 MMOL/L
CHOLEST SERPL-MCNC: 242 MG/DL
CK SERPL-CCNC: 100 U/L
CO2 SERPL-SCNC: 26 MMOL/L
COLOR: YELLOW
CREAT SERPL-MCNC: 0.89 MG/DL
EGFR: 69 ML/MIN/1.73M2
EPITHELIAL CELLS: >36 /HPF
ESTIMATED AVERAGE GLUCOSE: 120 MG/DL
GLUCOSE QUALITATIVE U: NEGATIVE MG/DL
GLUCOSE SERPL-MCNC: 91 MG/DL
HBA1C MFR BLD HPLC: 5.8 %
HDLC SERPL-MCNC: 97 MG/DL
KETONES URINE: NEGATIVE MG/DL
LDLC SERPL CALC-MCNC: 133 MG/DL
LEUKOCYTE ESTERASE URINE: ABNORMAL
MICROSCOPIC-UA: NORMAL
NITRITE URINE: NEGATIVE
NONHDLC SERPL-MCNC: 145 MG/DL
PH URINE: 6
POTASSIUM SERPL-SCNC: 3.8 MMOL/L
PROT SERPL-MCNC: 7 G/DL
PROTEIN URINE: NORMAL MG/DL
RED BLOOD CELLS URINE: 1 /HPF
SODIUM SERPL-SCNC: 140 MMOL/L
SPECIFIC GRAVITY URINE: 1.02
T4 FREE SERPL-MCNC: 1.6 NG/DL
TRIGL SERPL-MCNC: 73 MG/DL
TSH SERPL-ACNC: 0.68 UIU/ML
UROBILINOGEN URINE: 0.2 MG/DL
VIT B12 SERPL-MCNC: 682 PG/ML
WHITE BLOOD CELLS URINE: 11 /HPF

## 2023-10-05 PROCEDURE — G0439: CPT

## 2023-10-05 PROCEDURE — 99213 OFFICE O/P EST LOW 20 MIN: CPT | Mod: 25

## 2023-10-05 PROCEDURE — 93000 ELECTROCARDIOGRAM COMPLETE: CPT

## 2023-10-05 RX ORDER — ALENDRONATE SODIUM 70 MG/1
70 TABLET ORAL
Qty: 13 | Refills: 0 | Status: COMPLETED | COMMUNITY
Start: 2019-07-08 | End: 2023-10-05

## 2023-10-06 LAB
APPEARANCE: CLEAR
BILIRUBIN URINE: NEGATIVE
BLOOD URINE: NEGATIVE
COLOR: YELLOW
GLUCOSE QUALITATIVE U: NEGATIVE MG/DL
KETONES URINE: NEGATIVE MG/DL
LEUKOCYTE ESTERASE URINE: ABNORMAL
NITRITE URINE: NEGATIVE
PH URINE: 5.5
PROTEIN URINE: NEGATIVE MG/DL
SPECIFIC GRAVITY URINE: 1.01
UROBILINOGEN URINE: 0.2 MG/DL

## 2023-11-06 ENCOUNTER — APPOINTMENT (OUTPATIENT)
Dept: INTERNAL MEDICINE | Facility: CLINIC | Age: 73
End: 2023-11-06
Payer: MEDICARE

## 2023-11-06 DIAGNOSIS — I77.89 OTHER SPECIFIED DISORDERS OF ARTERIES AND ARTERIOLES: ICD-10-CM

## 2023-11-06 PROCEDURE — 93306 TTE W/DOPPLER COMPLETE: CPT

## 2023-11-08 PROBLEM — I77.89 ASCENDING AORTA ENLARGEMENT: Status: ACTIVE | Noted: 2023-10-05

## 2024-01-29 ENCOUNTER — OUTPATIENT (OUTPATIENT)
Dept: OUTPATIENT SERVICES | Facility: HOSPITAL | Age: 74
LOS: 1 days | End: 2024-01-29
Payer: MEDICARE

## 2024-01-29 ENCOUNTER — APPOINTMENT (OUTPATIENT)
Dept: ULTRASOUND IMAGING | Facility: IMAGING CENTER | Age: 74
End: 2024-01-29
Payer: MEDICARE

## 2024-01-29 DIAGNOSIS — Z00.8 ENCOUNTER FOR OTHER GENERAL EXAMINATION: ICD-10-CM

## 2024-01-29 DIAGNOSIS — C73 MALIGNANT NEOPLASM OF THYROID GLAND: ICD-10-CM

## 2024-01-29 PROCEDURE — 76536 US EXAM OF HEAD AND NECK: CPT

## 2024-01-29 PROCEDURE — 76536 US EXAM OF HEAD AND NECK: CPT | Mod: 26

## 2024-02-03 ENCOUNTER — NON-APPOINTMENT (OUTPATIENT)
Age: 74
End: 2024-02-03

## 2024-02-08 ENCOUNTER — APPOINTMENT (OUTPATIENT)
Dept: SURGERY | Facility: CLINIC | Age: 74
End: 2024-02-08
Payer: MEDICARE

## 2024-02-08 DIAGNOSIS — C73 MALIGNANT NEOPLASM OF THYROID GLAND: ICD-10-CM

## 2024-02-08 PROCEDURE — 99214 OFFICE O/P EST MOD 30 MIN: CPT

## 2024-02-08 NOTE — PHYSICAL EXAM
[de-identified] : well healed scar [de-identified] : no palpable thyroid nodules [Laryngoscopy Performed] : laryngoscopy was performed, see procedure section for findings [Midline] : located in midline position [Normal] : orientation to person, place, and time: normal

## 2024-02-08 NOTE — HISTORY OF PRESENT ILLNESS
[de-identified] : 28 years s/p total thyroidectomy with postop RASMUSSEN for tall cell variant papillary carcinoma by dr Munoz.  sonogram shows right thyroid bed nodule.  TG suppressed.  recent sonogram unchanged.   continues Synthroid  88 mcg 5 days per week and 50  mcg 2 days per week.  no changes medically since last visit. denies dysphagia, hoarseness or new lesions.  I have reviewed all old and new data and available images.

## 2024-02-16 ENCOUNTER — APPOINTMENT (OUTPATIENT)
Dept: OBGYN | Facility: CLINIC | Age: 74
End: 2024-02-16
Payer: MEDICARE

## 2024-02-16 VITALS
BODY MASS INDEX: 26.68 KG/M2 | SYSTOLIC BLOOD PRESSURE: 135 MMHG | DIASTOLIC BLOOD PRESSURE: 80 MMHG | HEIGHT: 62 IN | WEIGHT: 145 LBS

## 2024-02-16 DIAGNOSIS — N90.5 ATROPHY OF VULVA: ICD-10-CM

## 2024-02-16 DIAGNOSIS — Z01.411 ENCOUNTER FOR GYNECOLOGICAL EXAMINATION (GENERAL) (ROUTINE) WITH ABNORMAL FINDINGS: ICD-10-CM

## 2024-02-16 DIAGNOSIS — Z90.710 ACQUIRED ABSENCE OF BOTH CERVIX AND UTERUS: ICD-10-CM

## 2024-02-16 DIAGNOSIS — N95.2 POSTMENOPAUSAL ATROPHIC VAGINITIS: ICD-10-CM

## 2024-02-16 DIAGNOSIS — M81.0 AGE-RELATED OSTEOPOROSIS W/OUT CURRENT PATHOLOGICAL FRACTURE: ICD-10-CM

## 2024-02-16 PROCEDURE — G0328 FECAL BLOOD SCRN IMMUNOASSAY: CPT | Mod: GA,QW

## 2024-02-16 PROCEDURE — G0101: CPT | Mod: GA

## 2024-02-16 RX ORDER — ESTRADIOL 0.1 MG/G
0.1 CREAM VAGINAL
Qty: 2 | Refills: 3 | Status: ACTIVE | COMMUNITY
Start: 2021-04-29 | End: 1900-01-01

## 2024-02-16 NOTE — PLAN
[FreeTextEntry1] : 74 year old female presents for routine gyn exam - PMH: osteoporosis, atrophy, LAVH in 1992 for fibroids, breast biopsy, thyroid ca since 1997 s/p 1997 thyroidectomy BSE taught Breast and pelvic exam performed 2013 colonoscopy, due now - advised pt to schedule Referral to dermatologists given  B/l dense breasts: 11/22 mammogram and breast sonogram. Rx given, due now  Vaginal atrophy: Rx renewal for Estradiol - c/w internally 2x weekly, and externally qHs  Osteoporosis: 2021 DEXA bone density - f/u w/ endocrinologist D/w pt increased calcium in diet & Vit D 1000 U intake, & weight-bearing exercise (i.e. walking) for 30 min daily F/u w/ endocrinologist   RTO in 1 year or PRN

## 2024-02-16 NOTE — HISTORY OF PRESENT ILLNESS
[FreeTextEntry1] : 2024. FLORINA MALCOLM 74 year old female  LMP  presents for annual visit. PMH: osteoporosis, atrophy, LAVH in  for fibroids, breast biopsy, thyroid ca since  s/p 1997 thyroidectomy   She reports vaginal dryness, well-managed w/ Estradiol. She denies VB, abn discharge or vaginitis sxs. No urinary complaints. She has normal BM, no bloody stool. She denies abdominal or pelvic pain.   OBHx:  GynHx: atrophy, LAVH in  for fibroids. No Hx of STI, ovarian cysts, abnl paps, or pelvic infections. PMH: osteoporosis, hypothyroid, thyroid ca since  (s/p radioactive iodine once in ) SHx: breast biopsy, LAVH in  for fibroids,  thyroidectomy FHx: Mother w/ breast ca. Denies ovarian, uterine or colon cancer Med: Levothyroxine, Estrace cream 2x/week  All: NKDA [Mammogramdate] : 11/22 [TextBox_19] : BIRADS 2 [BreastSonogramDate] : 11/22 [TextBox_25] : BIRADS 2 [BoneDensityDate] : 2021 [TextBox_37] : osteoporosis, f/u w/ endocrinologist [ColonoscopyDate] : 2013

## 2024-02-16 NOTE — PHYSICAL EXAM
[Chaperone Present] : A chaperone was present in the examining room during all aspects of the physical examination [Appropriately responsive] : appropriately responsive [Alert] : alert [No Acute Distress] : no acute distress [No Lymphadenopathy] : no lymphadenopathy [Regular Rate Rhythm] : regular rate rhythm [No Murmurs] : no murmurs [Clear to Auscultation B/L] : clear to auscultation bilaterally [Soft] : soft [Non-tender] : non-tender [Non-distended] : non-distended [No HSM] : No HSM [No Lesions] : no lesions [No Mass] : no mass [Oriented x3] : oriented x3 [Examination Of The Breasts] : a normal appearance [No Masses] : no breast masses were palpable [Labia Majora] : normal [Labia Minora] : normal [Normal] : normal [Absent] : absent [Uterine Adnexae] : absent [FreeTextEntry6] : b/l dense breasts [FreeTextEntry1] : improved atrophy [FreeTextEntry4] : improved atrophy [FreeTextEntry9] : Guaiac negative. No masses noted.

## 2024-03-11 ENCOUNTER — RESULT REVIEW (OUTPATIENT)
Age: 74
End: 2024-03-11

## 2024-03-11 ENCOUNTER — APPOINTMENT (OUTPATIENT)
Dept: MAMMOGRAPHY | Facility: IMAGING CENTER | Age: 74
End: 2024-03-11
Payer: MEDICARE

## 2024-03-11 ENCOUNTER — APPOINTMENT (OUTPATIENT)
Dept: ULTRASOUND IMAGING | Facility: IMAGING CENTER | Age: 74
End: 2024-03-11
Payer: MEDICARE

## 2024-03-11 ENCOUNTER — OUTPATIENT (OUTPATIENT)
Dept: OUTPATIENT SERVICES | Facility: HOSPITAL | Age: 74
LOS: 1 days | End: 2024-03-11
Payer: MEDICARE

## 2024-03-11 DIAGNOSIS — Z00.8 ENCOUNTER FOR OTHER GENERAL EXAMINATION: ICD-10-CM

## 2024-03-11 PROCEDURE — 77067 SCR MAMMO BI INCL CAD: CPT

## 2024-03-11 PROCEDURE — 76641 ULTRASOUND BREAST COMPLETE: CPT | Mod: 26,50,GY

## 2024-03-11 PROCEDURE — 77067 SCR MAMMO BI INCL CAD: CPT | Mod: 26

## 2024-03-11 PROCEDURE — 77063 BREAST TOMOSYNTHESIS BI: CPT

## 2024-03-11 PROCEDURE — 76641 ULTRASOUND BREAST COMPLETE: CPT

## 2024-03-11 PROCEDURE — 77063 BREAST TOMOSYNTHESIS BI: CPT | Mod: 26

## 2024-06-14 ENCOUNTER — APPOINTMENT (OUTPATIENT)
Dept: INTERNAL MEDICINE | Facility: CLINIC | Age: 74
End: 2024-06-14
Payer: MEDICARE

## 2024-06-14 PROCEDURE — 93306 TTE W/DOPPLER COMPLETE: CPT

## 2024-12-12 ENCOUNTER — APPOINTMENT (OUTPATIENT)
Dept: INTERNAL MEDICINE | Facility: CLINIC | Age: 74
End: 2024-12-12
Payer: MEDICARE

## 2024-12-12 ENCOUNTER — NON-APPOINTMENT (OUTPATIENT)
Age: 74
End: 2024-12-12

## 2024-12-12 VITALS
WEIGHT: 138 LBS | HEART RATE: 74 BPM | TEMPERATURE: 98 F | OXYGEN SATURATION: 99 % | HEIGHT: 62 IN | BODY MASS INDEX: 25.4 KG/M2

## 2024-12-12 VITALS — SYSTOLIC BLOOD PRESSURE: 122 MMHG | DIASTOLIC BLOOD PRESSURE: 72 MMHG

## 2024-12-12 DIAGNOSIS — I35.1 NONRHEUMATIC AORTIC (VALVE) INSUFFICIENCY: ICD-10-CM

## 2024-12-12 DIAGNOSIS — I77.89 OTHER SPECIFIED DISORDERS OF ARTERIES AND ARTERIOLES: ICD-10-CM

## 2024-12-12 DIAGNOSIS — Z00.00 ENCOUNTER FOR GENERAL ADULT MEDICAL EXAMINATION W/OUT ABNORMAL FINDINGS: ICD-10-CM

## 2024-12-12 DIAGNOSIS — I10 ESSENTIAL (PRIMARY) HYPERTENSION: ICD-10-CM

## 2024-12-12 DIAGNOSIS — E78.9 DISORDER OF LIPOPROTEIN METABOLISM, UNSPECIFIED: ICD-10-CM

## 2024-12-12 DIAGNOSIS — C73 MALIGNANT NEOPLASM OF THYROID GLAND: ICD-10-CM

## 2024-12-12 PROCEDURE — 93000 ELECTROCARDIOGRAM COMPLETE: CPT

## 2024-12-12 PROCEDURE — G0439: CPT

## 2024-12-12 PROCEDURE — 99213 OFFICE O/P EST LOW 20 MIN: CPT | Mod: 25

## 2024-12-13 LAB
APPEARANCE: CLEAR
BILIRUBIN URINE: NEGATIVE
BLOOD URINE: NEGATIVE
COLOR: YELLOW
GLUCOSE QUALITATIVE U: NEGATIVE MG/DL
KETONES URINE: NEGATIVE MG/DL
LEUKOCYTE ESTERASE URINE: NEGATIVE
NITRITE URINE: NEGATIVE
PH URINE: 6
PROTEIN URINE: NEGATIVE MG/DL
SPECIFIC GRAVITY URINE: 1.01
UROBILINOGEN URINE: 0.2 MG/DL

## 2025-01-13 ENCOUNTER — APPOINTMENT (OUTPATIENT)
Dept: INTERNAL MEDICINE | Facility: CLINIC | Age: 75
End: 2025-01-13
Payer: MEDICARE

## 2025-01-13 PROCEDURE — 93306 TTE W/DOPPLER COMPLETE: CPT

## 2025-01-15 DIAGNOSIS — Z12.31 ENCOUNTER FOR SCREENING MAMMOGRAM FOR MALIGNANT NEOPLASM OF BREAST: ICD-10-CM

## 2025-01-15 DIAGNOSIS — R92.30 DENSE BREASTS, UNSPECIFIED: ICD-10-CM

## 2025-01-24 RX ORDER — ROSUVASTATIN CALCIUM 10 MG/1
10 TABLET, FILM COATED ORAL
Qty: 90 | Refills: 3 | Status: ACTIVE | COMMUNITY
Start: 2025-01-24 | End: 1900-01-01

## 2025-02-03 ENCOUNTER — APPOINTMENT (OUTPATIENT)
Dept: ULTRASOUND IMAGING | Facility: IMAGING CENTER | Age: 75
End: 2025-02-03
Payer: MEDICARE

## 2025-02-03 ENCOUNTER — OUTPATIENT (OUTPATIENT)
Dept: OUTPATIENT SERVICES | Facility: HOSPITAL | Age: 75
LOS: 1 days | End: 2025-02-03
Payer: MEDICARE

## 2025-02-03 DIAGNOSIS — Z00.8 ENCOUNTER FOR OTHER GENERAL EXAMINATION: ICD-10-CM

## 2025-02-03 PROCEDURE — 76536 US EXAM OF HEAD AND NECK: CPT | Mod: 26

## 2025-02-03 PROCEDURE — 76536 US EXAM OF HEAD AND NECK: CPT

## 2025-02-06 ENCOUNTER — NON-APPOINTMENT (OUTPATIENT)
Age: 75
End: 2025-02-06

## 2025-02-13 ENCOUNTER — APPOINTMENT (OUTPATIENT)
Dept: SURGERY | Facility: CLINIC | Age: 75
End: 2025-02-13
Payer: MEDICARE

## 2025-02-13 DIAGNOSIS — E89.0 POSTPROCEDURAL HYPOTHYROIDISM: ICD-10-CM

## 2025-02-13 DIAGNOSIS — C73 MALIGNANT NEOPLASM OF THYROID GLAND: ICD-10-CM

## 2025-02-13 PROCEDURE — 99214 OFFICE O/P EST MOD 30 MIN: CPT

## 2025-06-12 ENCOUNTER — APPOINTMENT (OUTPATIENT)
Dept: OBGYN | Facility: CLINIC | Age: 75
End: 2025-06-12
Payer: MEDICARE

## 2025-06-12 ENCOUNTER — NON-APPOINTMENT (OUTPATIENT)
Age: 75
End: 2025-06-12

## 2025-06-12 VITALS
BODY MASS INDEX: 25.4 KG/M2 | SYSTOLIC BLOOD PRESSURE: 136 MMHG | HEIGHT: 62 IN | DIASTOLIC BLOOD PRESSURE: 70 MMHG | WEIGHT: 138 LBS

## 2025-06-12 PROCEDURE — G0328 FECAL BLOOD SCRN IMMUNOASSAY: CPT | Mod: QW

## 2025-06-12 PROCEDURE — G0101: CPT

## 2025-06-12 PROCEDURE — 99213 OFFICE O/P EST LOW 20 MIN: CPT | Mod: 25

## 2025-06-12 PROCEDURE — 99459 PELVIC EXAMINATION: CPT

## 2025-06-26 ENCOUNTER — RESULT REVIEW (OUTPATIENT)
Age: 75
End: 2025-06-26

## 2025-06-26 ENCOUNTER — OUTPATIENT (OUTPATIENT)
Dept: OUTPATIENT SERVICES | Facility: HOSPITAL | Age: 75
LOS: 1 days | End: 2025-06-26
Payer: MEDICARE

## 2025-06-26 ENCOUNTER — APPOINTMENT (OUTPATIENT)
Dept: MAMMOGRAPHY | Facility: IMAGING CENTER | Age: 75
End: 2025-06-26
Payer: MEDICARE

## 2025-06-26 ENCOUNTER — APPOINTMENT (OUTPATIENT)
Dept: ULTRASOUND IMAGING | Facility: IMAGING CENTER | Age: 75
End: 2025-06-26
Payer: MEDICARE

## 2025-06-26 DIAGNOSIS — Z00.8 ENCOUNTER FOR OTHER GENERAL EXAMINATION: ICD-10-CM

## 2025-06-26 PROCEDURE — 77067 SCR MAMMO BI INCL CAD: CPT

## 2025-06-26 PROCEDURE — 76641 ULTRASOUND BREAST COMPLETE: CPT | Mod: 26,50,GA

## 2025-06-26 PROCEDURE — 77063 BREAST TOMOSYNTHESIS BI: CPT

## 2025-06-26 PROCEDURE — 76641 ULTRASOUND BREAST COMPLETE: CPT

## 2025-06-26 PROCEDURE — 77067 SCR MAMMO BI INCL CAD: CPT | Mod: 26

## 2025-06-26 PROCEDURE — 77063 BREAST TOMOSYNTHESIS BI: CPT | Mod: 26

## 2025-07-22 ENCOUNTER — NON-APPOINTMENT (OUTPATIENT)
Age: 75
End: 2025-07-22

## 2025-07-24 ENCOUNTER — APPOINTMENT (OUTPATIENT)
Dept: SURGICAL ONCOLOGY | Facility: CLINIC | Age: 75
End: 2025-07-24
Payer: MEDICARE

## 2025-07-24 ENCOUNTER — NON-APPOINTMENT (OUTPATIENT)
Age: 75
End: 2025-07-24

## 2025-07-24 VITALS
RESPIRATION RATE: 17 BRPM | WEIGHT: 138 LBS | HEIGHT: 61.5 IN | SYSTOLIC BLOOD PRESSURE: 130 MMHG | BODY MASS INDEX: 25.72 KG/M2 | HEART RATE: 87 BPM | OXYGEN SATURATION: 99 % | DIASTOLIC BLOOD PRESSURE: 82 MMHG

## 2025-07-24 DIAGNOSIS — N63.0 UNSPECIFIED LUMP IN UNSPECIFIED BREAST: ICD-10-CM

## 2025-07-24 PROCEDURE — 99215 OFFICE O/P EST HI 40 MIN: CPT

## 2025-07-28 ENCOUNTER — APPOINTMENT (OUTPATIENT)
Dept: SURGICAL ONCOLOGY | Facility: CLINIC | Age: 75
End: 2025-07-28
Payer: MEDICARE

## 2025-07-28 DIAGNOSIS — N64.89 OTHER SPECIFIED DISORDERS OF BREAST: ICD-10-CM

## 2025-07-28 PROCEDURE — 19083 BX BREAST 1ST LESION US IMAG: CPT | Mod: LT

## 2025-07-29 ENCOUNTER — NON-APPOINTMENT (OUTPATIENT)
Age: 75
End: 2025-07-29

## 2025-07-29 LAB — CORE LAB BIOPSY: NORMAL
